# Patient Record
Sex: MALE | Race: OTHER
[De-identification: names, ages, dates, MRNs, and addresses within clinical notes are randomized per-mention and may not be internally consistent; named-entity substitution may affect disease eponyms.]

---

## 2018-11-01 ENCOUNTER — HOSPITAL ENCOUNTER (INPATIENT)
Dept: HOSPITAL 15 - ER | Age: 68
LOS: 6 days | Discharge: HOME | DRG: 282 | End: 2018-11-07
Attending: INTERNAL MEDICINE | Admitting: NURSE PRACTITIONER
Payer: MEDICARE

## 2018-11-01 VITALS — BODY MASS INDEX: 46.65 KG/M2 | HEIGHT: 69 IN | WEIGHT: 315 LBS

## 2018-11-01 DIAGNOSIS — J98.4: ICD-10-CM

## 2018-11-01 DIAGNOSIS — J98.11: ICD-10-CM

## 2018-11-01 DIAGNOSIS — N13.30: ICD-10-CM

## 2018-11-01 DIAGNOSIS — N40.1: ICD-10-CM

## 2018-11-01 DIAGNOSIS — K85.10: Primary | ICD-10-CM

## 2018-11-01 DIAGNOSIS — R33.9: ICD-10-CM

## 2018-11-01 DIAGNOSIS — R79.1: ICD-10-CM

## 2018-11-01 DIAGNOSIS — E46: ICD-10-CM

## 2018-11-01 DIAGNOSIS — Z71.3: ICD-10-CM

## 2018-11-01 DIAGNOSIS — K82.8: ICD-10-CM

## 2018-11-01 DIAGNOSIS — N28.1: ICD-10-CM

## 2018-11-01 LAB
ALBUMIN SERPL-MCNC: 3.8 G/DL (ref 3.4–5)
ALP SERPL-CCNC: 137 U/L (ref 45–117)
ALT SERPL-CCNC: 131 U/L (ref 16–61)
AMYLASE SERPL-CCNC: 102 U/L (ref 25–115)
ANION GAP SERPL CALCULATED.3IONS-SCNC: 10 MMOL/L (ref 5–15)
APTT PPP: 23.5 SEC (ref 23.78–33.04)
BILIRUB SERPL-MCNC: 2 MG/DL (ref 0.2–1)
BUN SERPL-MCNC: 15 MG/DL (ref 7–18)
BUN/CREAT SERPL: 13.9
CALCIUM SERPL-MCNC: 9.2 MG/DL (ref 8.5–10.1)
CHLORIDE SERPL-SCNC: 103 MMOL/L (ref 98–107)
CO2 SERPL-SCNC: 25 MMOL/L (ref 21–32)
GLUCOSE SERPL-MCNC: 187 MG/DL (ref 74–106)
HCT VFR BLD AUTO: 50.8 % (ref 41–53)
HGB BLD-MCNC: 16.9 G/DL (ref 13.5–17.5)
INR PPP: 0.99 (ref 0.9–1.15)
LIPASE SERPL-CCNC: 1180 U/L (ref 73–393)
MAGNESIUM SERPL-MCNC: 1.9 MG/DL (ref 1.6–2.6)
MCH RBC QN AUTO: 32 PG (ref 28–32)
MCV RBC AUTO: 96.1 FL (ref 80–100)
POTASSIUM SERPL-SCNC: 4 MMOL/L (ref 3.5–5.1)
PROT SERPL-MCNC: 8.3 G/DL (ref 6.4–8.2)
PROTHROMBIN TIME: 10.6 SEC (ref 9.27–12.13)
SODIUM SERPL-SCNC: 138 MMOL/L (ref 136–145)

## 2018-11-01 PROCEDURE — 74177 CT ABD & PELVIS W/CONTRAST: CPT

## 2018-11-01 PROCEDURE — 85007 BL SMEAR W/DIFF WBC COUNT: CPT

## 2018-11-01 PROCEDURE — 71046 X-RAY EXAM CHEST 2 VIEWS: CPT

## 2018-11-01 PROCEDURE — 76700 US EXAM ABDOM COMPLETE: CPT

## 2018-11-01 PROCEDURE — 36415 COLL VENOUS BLD VENIPUNCTURE: CPT

## 2018-11-01 PROCEDURE — 83690 ASSAY OF LIPASE: CPT

## 2018-11-01 PROCEDURE — 84443 ASSAY THYROID STIM HORMONE: CPT

## 2018-11-01 PROCEDURE — 84484 ASSAY OF TROPONIN QUANT: CPT

## 2018-11-01 PROCEDURE — 78226 HEPATOBILIARY SYSTEM IMAGING: CPT

## 2018-11-01 PROCEDURE — 81001 URINALYSIS AUTO W/SCOPE: CPT

## 2018-11-01 PROCEDURE — 93005 ELECTROCARDIOGRAM TRACING: CPT

## 2018-11-01 PROCEDURE — 83880 ASSAY OF NATRIURETIC PEPTIDE: CPT

## 2018-11-01 PROCEDURE — 97163 PT EVAL HIGH COMPLEX 45 MIN: CPT

## 2018-11-01 PROCEDURE — 96361 HYDRATE IV INFUSION ADD-ON: CPT

## 2018-11-01 PROCEDURE — 85379 FIBRIN DEGRADATION QUANT: CPT

## 2018-11-01 PROCEDURE — 96375 TX/PRO/DX INJ NEW DRUG ADDON: CPT

## 2018-11-01 PROCEDURE — 96374 THER/PROPH/DIAG INJ IV PUSH: CPT

## 2018-11-01 PROCEDURE — 80074 ACUTE HEPATITIS PANEL: CPT

## 2018-11-01 PROCEDURE — 80053 COMPREHEN METABOLIC PANEL: CPT

## 2018-11-01 PROCEDURE — 85027 COMPLETE CBC AUTOMATED: CPT

## 2018-11-01 PROCEDURE — 85730 THROMBOPLASTIN TIME PARTIAL: CPT

## 2018-11-01 PROCEDURE — 83036 HEMOGLOBIN GLYCOSYLATED A1C: CPT

## 2018-11-01 PROCEDURE — 97116 GAIT TRAINING THERAPY: CPT

## 2018-11-01 PROCEDURE — 85025 COMPLETE CBC W/AUTO DIFF WBC: CPT

## 2018-11-01 PROCEDURE — 82150 ASSAY OF AMYLASE: CPT

## 2018-11-01 PROCEDURE — 71275 CT ANGIOGRAPHY CHEST: CPT

## 2018-11-01 PROCEDURE — 83735 ASSAY OF MAGNESIUM: CPT

## 2018-11-01 PROCEDURE — 94761 N-INVAS EAR/PLS OXIMETRY MLT: CPT

## 2018-11-01 PROCEDURE — 85610 PROTHROMBIN TIME: CPT

## 2018-11-02 VITALS — SYSTOLIC BLOOD PRESSURE: 104 MMHG | DIASTOLIC BLOOD PRESSURE: 61 MMHG

## 2018-11-02 VITALS — DIASTOLIC BLOOD PRESSURE: 61 MMHG | SYSTOLIC BLOOD PRESSURE: 102 MMHG

## 2018-11-02 VITALS — SYSTOLIC BLOOD PRESSURE: 102 MMHG | DIASTOLIC BLOOD PRESSURE: 61 MMHG

## 2018-11-02 VITALS — SYSTOLIC BLOOD PRESSURE: 116 MMHG | DIASTOLIC BLOOD PRESSURE: 67 MMHG

## 2018-11-02 VITALS — DIASTOLIC BLOOD PRESSURE: 69 MMHG | SYSTOLIC BLOOD PRESSURE: 108 MMHG

## 2018-11-02 RX ADMIN — SODIUM CHLORIDE SCH MLS/HR: 0.9 INJECTION, SOLUTION INTRAVENOUS at 01:30

## 2018-11-02 RX ADMIN — CEFTRIAXONE SODIUM SCH MLS/HR: 1 INJECTION, POWDER, FOR SOLUTION INTRAMUSCULAR; INTRAVENOUS at 10:32

## 2018-11-02 RX ADMIN — SODIUM CHLORIDE SCH MLS/HR: 0.9 INJECTION, SOLUTION INTRAVENOUS at 12:00

## 2018-11-03 VITALS — DIASTOLIC BLOOD PRESSURE: 83 MMHG | SYSTOLIC BLOOD PRESSURE: 126 MMHG

## 2018-11-03 VITALS — DIASTOLIC BLOOD PRESSURE: 77 MMHG | SYSTOLIC BLOOD PRESSURE: 126 MMHG

## 2018-11-03 VITALS — DIASTOLIC BLOOD PRESSURE: 79 MMHG | SYSTOLIC BLOOD PRESSURE: 128 MMHG

## 2018-11-03 VITALS — DIASTOLIC BLOOD PRESSURE: 71 MMHG | SYSTOLIC BLOOD PRESSURE: 127 MMHG

## 2018-11-03 VITALS — DIASTOLIC BLOOD PRESSURE: 76 MMHG | SYSTOLIC BLOOD PRESSURE: 130 MMHG

## 2018-11-03 LAB
ALBUMIN SERPL-MCNC: 3 G/DL (ref 3.4–5)
ALP SERPL-CCNC: 124 U/L (ref 45–117)
ALT SERPL-CCNC: 201 U/L (ref 16–61)
AMYLASE SERPL-CCNC: 26 U/L (ref 25–115)
ANION GAP SERPL CALCULATED.3IONS-SCNC: 7 MMOL/L (ref 5–15)
BILIRUB SERPL-MCNC: 2.6 MG/DL (ref 0.2–1)
BUN SERPL-MCNC: 8 MG/DL (ref 7–18)
BUN/CREAT SERPL: 10.1
CALCIUM SERPL-MCNC: 8.1 MG/DL (ref 8.5–10.1)
CHLORIDE SERPL-SCNC: 109 MMOL/L (ref 98–107)
CO2 SERPL-SCNC: 23 MMOL/L (ref 21–32)
GLUCOSE SERPL-MCNC: 145 MG/DL (ref 74–106)
HCT VFR BLD AUTO: 44.8 % (ref 41–53)
HGB BLD-MCNC: 15.1 G/DL (ref 13.5–17.5)
LIPASE SERPL-CCNC: 149 U/L (ref 73–393)
MCH RBC QN AUTO: 32.4 PG (ref 28–32)
MCV RBC AUTO: 96.1 FL (ref 80–100)
NRBC BLD QL AUTO: 0 %
POTASSIUM SERPL-SCNC: 3.9 MMOL/L (ref 3.5–5.1)
PROT SERPL-MCNC: 7 G/DL (ref 6.4–8.2)
SODIUM SERPL-SCNC: 139 MMOL/L (ref 136–145)

## 2018-11-03 RX ADMIN — DEXTROSE SCH UNITS: 5 SOLUTION INTRAVENOUS at 14:34

## 2018-11-03 RX ADMIN — MORPHINE SULFATE PRN MG: 4 INJECTION, SOLUTION INTRAMUSCULAR; INTRAVENOUS at 05:58

## 2018-11-03 RX ADMIN — SODIUM CHLORIDE SCH MLS/HR: 0.9 INJECTION, SOLUTION INTRAVENOUS at 08:00

## 2018-11-03 RX ADMIN — CEFTRIAXONE SODIUM SCH MLS/HR: 1 INJECTION, POWDER, FOR SOLUTION INTRAMUSCULAR; INTRAVENOUS at 09:00

## 2018-11-03 RX ADMIN — SODIUM CHLORIDE SCH MLS/HR: 0.9 INJECTION, SOLUTION INTRAVENOUS at 18:21

## 2018-11-03 RX ADMIN — HYDROCODONE BITARTRATE AND ACETAMINOPHEN PRN TAB: 5; 325 TABLET ORAL at 18:36

## 2018-11-03 RX ADMIN — PANTOPRAZOLE SODIUM SCH MG: 40 INJECTION, POWDER, FOR SOLUTION INTRAVENOUS at 10:00

## 2018-11-03 RX ADMIN — DEXTROSE SCH UNITS: 5 SOLUTION INTRAVENOUS at 21:41

## 2018-11-03 RX ADMIN — MORPHINE SULFATE PRN MG: 4 INJECTION, SOLUTION INTRAMUSCULAR; INTRAVENOUS at 21:07

## 2018-11-03 RX ADMIN — TEMAZEPAM PRN MG: 15 CAPSULE ORAL at 21:40

## 2018-11-04 VITALS — DIASTOLIC BLOOD PRESSURE: 85 MMHG | SYSTOLIC BLOOD PRESSURE: 142 MMHG

## 2018-11-04 VITALS — DIASTOLIC BLOOD PRESSURE: 90 MMHG | SYSTOLIC BLOOD PRESSURE: 145 MMHG

## 2018-11-04 VITALS — DIASTOLIC BLOOD PRESSURE: 76 MMHG | SYSTOLIC BLOOD PRESSURE: 148 MMHG

## 2018-11-04 VITALS — DIASTOLIC BLOOD PRESSURE: 79 MMHG | SYSTOLIC BLOOD PRESSURE: 140 MMHG

## 2018-11-04 VITALS — SYSTOLIC BLOOD PRESSURE: 123 MMHG | DIASTOLIC BLOOD PRESSURE: 79 MMHG

## 2018-11-04 LAB
ALBUMIN SERPL-MCNC: 3.3 G/DL (ref 3.4–5)
ALP SERPL-CCNC: 129 U/L (ref 45–117)
ALT SERPL-CCNC: 137 U/L (ref 16–61)
ANION GAP SERPL CALCULATED.3IONS-SCNC: 8 MMOL/L (ref 5–15)
BILIRUB SERPL-MCNC: 0.9 MG/DL (ref 0.2–1)
BUN SERPL-MCNC: 6 MG/DL (ref 7–18)
BUN/CREAT SERPL: 7.1
CALCIUM SERPL-MCNC: 8.6 MG/DL (ref 8.5–10.1)
CHLORIDE SERPL-SCNC: 105 MMOL/L (ref 98–107)
CO2 SERPL-SCNC: 23 MMOL/L (ref 21–32)
GLUCOSE SERPL-MCNC: 180 MG/DL (ref 74–106)
POTASSIUM SERPL-SCNC: 3.5 MMOL/L (ref 3.5–5.1)
PROT SERPL-MCNC: 8 G/DL (ref 6.4–8.2)
SODIUM SERPL-SCNC: 136 MMOL/L (ref 136–145)

## 2018-11-04 RX ADMIN — MORPHINE SULFATE PRN MG: 4 INJECTION, SOLUTION INTRAMUSCULAR; INTRAVENOUS at 05:36

## 2018-11-04 RX ADMIN — SODIUM CHLORIDE SCH MLS/HR: 0.9 INJECTION, SOLUTION INTRAVENOUS at 14:00

## 2018-11-04 RX ADMIN — DEXTROSE SCH UNITS: 5 SOLUTION INTRAVENOUS at 21:53

## 2018-11-04 RX ADMIN — PANTOPRAZOLE SODIUM SCH MG: 40 INJECTION, POWDER, FOR SOLUTION INTRAVENOUS at 10:01

## 2018-11-04 RX ADMIN — HYDROCODONE BITARTRATE AND ACETAMINOPHEN PRN TAB: 5; 325 TABLET ORAL at 09:19

## 2018-11-04 RX ADMIN — CEFTRIAXONE SODIUM SCH MLS/HR: 1 INJECTION, POWDER, FOR SOLUTION INTRAMUSCULAR; INTRAVENOUS at 09:18

## 2018-11-04 RX ADMIN — TEMAZEPAM PRN MG: 15 CAPSULE ORAL at 21:52

## 2018-11-04 RX ADMIN — SODIUM CHLORIDE SCH MLS/HR: 0.9 INJECTION, SOLUTION INTRAVENOUS at 21:22

## 2018-11-04 RX ADMIN — MORPHINE SULFATE PRN MG: 4 INJECTION, SOLUTION INTRAMUSCULAR; INTRAVENOUS at 12:23

## 2018-11-04 RX ADMIN — DEXTROSE SCH UNITS: 5 SOLUTION INTRAVENOUS at 05:38

## 2018-11-04 RX ADMIN — DEXTROSE SCH UNITS: 5 SOLUTION INTRAVENOUS at 14:36

## 2018-11-04 RX ADMIN — MORPHINE SULFATE PRN MG: 4 INJECTION, SOLUTION INTRAMUSCULAR; INTRAVENOUS at 01:55

## 2018-11-04 RX ADMIN — HYDROCODONE BITARTRATE AND ACETAMINOPHEN PRN TAB: 5; 325 TABLET ORAL at 02:42

## 2018-11-04 RX ADMIN — SODIUM CHLORIDE SCH MLS/HR: 0.9 INJECTION, SOLUTION INTRAVENOUS at 04:44

## 2018-11-05 VITALS — SYSTOLIC BLOOD PRESSURE: 135 MMHG | DIASTOLIC BLOOD PRESSURE: 82 MMHG

## 2018-11-05 VITALS — SYSTOLIC BLOOD PRESSURE: 131 MMHG | DIASTOLIC BLOOD PRESSURE: 76 MMHG

## 2018-11-05 VITALS — DIASTOLIC BLOOD PRESSURE: 69 MMHG | SYSTOLIC BLOOD PRESSURE: 114 MMHG

## 2018-11-05 VITALS — SYSTOLIC BLOOD PRESSURE: 133 MMHG | DIASTOLIC BLOOD PRESSURE: 88 MMHG

## 2018-11-05 VITALS — SYSTOLIC BLOOD PRESSURE: 110 MMHG | DIASTOLIC BLOOD PRESSURE: 68 MMHG

## 2018-11-05 VITALS — SYSTOLIC BLOOD PRESSURE: 140 MMHG | DIASTOLIC BLOOD PRESSURE: 91 MMHG

## 2018-11-05 LAB
ALBUMIN SERPL-MCNC: 2.7 G/DL (ref 3.4–5)
ALP SERPL-CCNC: 95 U/L (ref 45–117)
ALT SERPL-CCNC: 89 U/L (ref 16–61)
ANION GAP SERPL CALCULATED.3IONS-SCNC: 7 MMOL/L (ref 5–15)
BILIRUB SERPL-MCNC: 0.8 MG/DL (ref 0.2–1)
BUN SERPL-MCNC: 8 MG/DL (ref 7–18)
BUN/CREAT SERPL: 9.5
CALCIUM SERPL-MCNC: 8.1 MG/DL (ref 8.5–10.1)
CHLORIDE SERPL-SCNC: 112 MMOL/L (ref 98–107)
CO2 SERPL-SCNC: 24 MMOL/L (ref 21–32)
GLUCOSE SERPL-MCNC: 117 MG/DL (ref 74–106)
POTASSIUM SERPL-SCNC: 3.6 MMOL/L (ref 3.5–5.1)
PROT SERPL-MCNC: 6.7 G/DL (ref 6.4–8.2)
SODIUM SERPL-SCNC: 143 MMOL/L (ref 136–145)

## 2018-11-05 RX ADMIN — TEMAZEPAM PRN MG: 15 CAPSULE ORAL at 20:56

## 2018-11-05 RX ADMIN — DEXTROSE SCH UNITS: 5 SOLUTION INTRAVENOUS at 20:55

## 2018-11-05 RX ADMIN — SODIUM CHLORIDE SCH MLS/HR: 0.9 INJECTION, SOLUTION INTRAVENOUS at 09:53

## 2018-11-05 RX ADMIN — CEFTRIAXONE SODIUM SCH MLS/HR: 1 INJECTION, POWDER, FOR SOLUTION INTRAMUSCULAR; INTRAVENOUS at 09:51

## 2018-11-05 RX ADMIN — DEXTROSE SCH UNITS: 5 SOLUTION INTRAVENOUS at 14:19

## 2018-11-05 RX ADMIN — PANTOPRAZOLE SODIUM SCH MG: 40 INJECTION, POWDER, FOR SOLUTION INTRAVENOUS at 09:51

## 2018-11-05 RX ADMIN — DEXTROSE SCH UNITS: 5 SOLUTION INTRAVENOUS at 06:02

## 2018-11-05 RX ADMIN — SODIUM CHLORIDE SCH MLS/HR: 0.9 INJECTION, SOLUTION INTRAVENOUS at 20:00

## 2018-11-06 VITALS — DIASTOLIC BLOOD PRESSURE: 75 MMHG | SYSTOLIC BLOOD PRESSURE: 129 MMHG

## 2018-11-06 VITALS — SYSTOLIC BLOOD PRESSURE: 124 MMHG | DIASTOLIC BLOOD PRESSURE: 75 MMHG

## 2018-11-06 VITALS — SYSTOLIC BLOOD PRESSURE: 127 MMHG | DIASTOLIC BLOOD PRESSURE: 81 MMHG

## 2018-11-06 VITALS — SYSTOLIC BLOOD PRESSURE: 121 MMHG | DIASTOLIC BLOOD PRESSURE: 72 MMHG

## 2018-11-06 VITALS — SYSTOLIC BLOOD PRESSURE: 127 MMHG | DIASTOLIC BLOOD PRESSURE: 77 MMHG

## 2018-11-06 LAB
ALBUMIN SERPL-MCNC: 2.9 G/DL (ref 3.4–5)
ALP SERPL-CCNC: 90 U/L (ref 45–117)
ALT SERPL-CCNC: 74 U/L (ref 16–61)
ANION GAP SERPL CALCULATED.3IONS-SCNC: 8 MMOL/L (ref 5–15)
BILIRUB SERPL-MCNC: 0.9 MG/DL (ref 0.2–1)
BUN SERPL-MCNC: 7 MG/DL (ref 7–18)
BUN/CREAT SERPL: 9.2
CALCIUM SERPL-MCNC: 8.7 MG/DL (ref 8.5–10.1)
CHLORIDE SERPL-SCNC: 108 MMOL/L (ref 98–107)
CO2 SERPL-SCNC: 27 MMOL/L (ref 21–32)
GLUCOSE SERPL-MCNC: 100 MG/DL (ref 74–106)
HCT VFR BLD AUTO: 43.5 % (ref 41–53)
HGB BLD-MCNC: 14.6 G/DL (ref 13.5–17.5)
MCH RBC QN AUTO: 31.9 PG (ref 28–32)
MCV RBC AUTO: 94.9 FL (ref 80–100)
NRBC BLD QL AUTO: 0.1 %
POTASSIUM SERPL-SCNC: 4 MMOL/L (ref 3.5–5.1)
PROT SERPL-MCNC: 6.9 G/DL (ref 6.4–8.2)
SODIUM SERPL-SCNC: 143 MMOL/L (ref 136–145)

## 2018-11-06 RX ADMIN — DEXTROSE SCH UNITS: 5 SOLUTION INTRAVENOUS at 14:24

## 2018-11-06 RX ADMIN — PANTOPRAZOLE SODIUM SCH MG: 40 INJECTION, POWDER, FOR SOLUTION INTRAVENOUS at 10:32

## 2018-11-06 RX ADMIN — SODIUM CHLORIDE SCH MLS/HR: 0.9 INJECTION, SOLUTION INTRAVENOUS at 06:00

## 2018-11-06 RX ADMIN — DEXTROSE SCH UNITS: 5 SOLUTION INTRAVENOUS at 21:40

## 2018-11-06 RX ADMIN — CEFTRIAXONE SODIUM SCH MLS/HR: 1 INJECTION, POWDER, FOR SOLUTION INTRAMUSCULAR; INTRAVENOUS at 10:32

## 2018-11-06 RX ADMIN — DEXTROSE SCH UNITS: 5 SOLUTION INTRAVENOUS at 06:08

## 2018-11-07 VITALS — SYSTOLIC BLOOD PRESSURE: 138 MMHG | DIASTOLIC BLOOD PRESSURE: 84 MMHG

## 2018-11-07 VITALS — SYSTOLIC BLOOD PRESSURE: 136 MMHG | DIASTOLIC BLOOD PRESSURE: 84 MMHG

## 2018-11-07 VITALS — DIASTOLIC BLOOD PRESSURE: 80 MMHG | SYSTOLIC BLOOD PRESSURE: 137 MMHG

## 2018-11-07 VITALS — SYSTOLIC BLOOD PRESSURE: 132 MMHG | DIASTOLIC BLOOD PRESSURE: 79 MMHG

## 2018-11-07 RX ADMIN — DEXTROSE SCH UNITS: 5 SOLUTION INTRAVENOUS at 05:30

## 2018-11-07 RX ADMIN — CEFTRIAXONE SODIUM SCH MLS/HR: 1 INJECTION, POWDER, FOR SOLUTION INTRAMUSCULAR; INTRAVENOUS at 09:43

## 2018-11-07 RX ADMIN — PANTOPRAZOLE SODIUM SCH MG: 40 INJECTION, POWDER, FOR SOLUTION INTRAVENOUS at 09:43

## 2018-11-07 RX ADMIN — DEXTROSE SCH UNITS: 5 SOLUTION INTRAVENOUS at 14:00

## 2018-12-14 ENCOUNTER — HOSPITAL ENCOUNTER (EMERGENCY)
Dept: HOSPITAL 15 - ER | Age: 68
Discharge: HOME | End: 2018-12-14
Payer: MEDICARE

## 2018-12-14 VITALS — WEIGHT: 215 LBS | HEIGHT: 68 IN | BODY MASS INDEX: 32.58 KG/M2

## 2018-12-14 VITALS — SYSTOLIC BLOOD PRESSURE: 129 MMHG | DIASTOLIC BLOOD PRESSURE: 79 MMHG

## 2018-12-14 DIAGNOSIS — E11.9: ICD-10-CM

## 2018-12-14 DIAGNOSIS — R33.9: Primary | ICD-10-CM

## 2018-12-14 PROCEDURE — 51702 INSERT TEMP BLADDER CATH: CPT

## 2018-12-14 PROCEDURE — 81001 URINALYSIS AUTO W/SCOPE: CPT

## 2021-02-01 ENCOUNTER — HOSPITAL ENCOUNTER (EMERGENCY)
Dept: HOSPITAL 15 - ER | Age: 71
Discharge: HOME | End: 2021-02-01
Payer: MEDICARE

## 2021-02-01 VITALS — DIASTOLIC BLOOD PRESSURE: 96 MMHG | SYSTOLIC BLOOD PRESSURE: 149 MMHG

## 2021-02-01 VITALS — HEIGHT: 68 IN | BODY MASS INDEX: 30.31 KG/M2 | WEIGHT: 200 LBS

## 2021-02-01 DIAGNOSIS — N39.0: Primary | ICD-10-CM

## 2021-02-01 DIAGNOSIS — Z46.6: ICD-10-CM

## 2021-02-01 DIAGNOSIS — E11.9: ICD-10-CM

## 2021-02-01 PROCEDURE — 99284 EMERGENCY DEPT VISIT MOD MDM: CPT

## 2021-02-01 PROCEDURE — 81002 URINALYSIS NONAUTO W/O SCOPE: CPT

## 2021-02-01 PROCEDURE — 51702 INSERT TEMP BLADDER CATH: CPT

## 2021-02-01 PROCEDURE — 96372 THER/PROPH/DIAG INJ SC/IM: CPT

## 2023-01-21 ENCOUNTER — HOSPITAL ENCOUNTER (EMERGENCY)
Dept: HOSPITAL 15 - ER | Age: 73
Discharge: HOME | End: 2023-01-21
Payer: MEDICARE

## 2023-01-21 VITALS — SYSTOLIC BLOOD PRESSURE: 137 MMHG | DIASTOLIC BLOOD PRESSURE: 78 MMHG

## 2023-01-21 VITALS — HEIGHT: 68 IN | BODY MASS INDEX: 25.83 KG/M2 | WEIGHT: 170.42 LBS

## 2023-01-21 DIAGNOSIS — N40.1: Primary | ICD-10-CM

## 2023-01-21 DIAGNOSIS — I10: ICD-10-CM

## 2023-01-21 DIAGNOSIS — E11.65: ICD-10-CM

## 2023-01-21 LAB
ALBUMIN SERPL-MCNC: 3.5 G/DL (ref 3.4–5)
ALP SERPL-CCNC: 60 U/L (ref 45–117)
ALT SERPL-CCNC: 35 U/L (ref 16–61)
ANION GAP SERPL CALCULATED.3IONS-SCNC: 5 MMOL/L (ref 5–15)
BILIRUB SERPL-MCNC: 0.5 MG/DL (ref 0.2–1)
BUN SERPL-MCNC: 15 MG/DL (ref 7–18)
BUN/CREAT SERPL: 17.4
CALCIUM SERPL-MCNC: 8.4 MG/DL (ref 8.5–10.1)
CHLORIDE SERPL-SCNC: 105 MMOL/L (ref 98–107)
CO2 SERPL-SCNC: 26 MMOL/L (ref 21–32)
GLUCOSE SERPL-MCNC: 196 MG/DL (ref 74–106)
HCT VFR BLD AUTO: 45.5 % (ref 41–53)
HGB BLD-MCNC: 15.2 G/DL (ref 13.5–17.5)
LIPASE SERPL-CCNC: 137 U/L (ref 73–393)
MAGNESIUM SERPL-MCNC: 2.6 MG/DL (ref 1.6–2.6)
MCH RBC QN AUTO: 31.8 PG (ref 28–32)
MCV RBC AUTO: 95 FL (ref 80–100)
NRBC BLD QL AUTO: 0 %
POTASSIUM SERPL-SCNC: 3.9 MMOL/L (ref 3.5–5.1)
PROT SERPL-MCNC: 7.6 G/DL (ref 6.4–8.2)
SODIUM SERPL-SCNC: 136 MMOL/L (ref 136–145)

## 2023-01-21 PROCEDURE — 83735 ASSAY OF MAGNESIUM: CPT

## 2023-01-21 PROCEDURE — 80053 COMPREHEN METABOLIC PANEL: CPT

## 2023-01-21 PROCEDURE — 83690 ASSAY OF LIPASE: CPT

## 2023-01-21 PROCEDURE — 81001 URINALYSIS AUTO W/SCOPE: CPT

## 2023-01-21 PROCEDURE — 99284 EMERGENCY DEPT VISIT MOD MDM: CPT

## 2023-01-21 PROCEDURE — 96361 HYDRATE IV INFUSION ADD-ON: CPT

## 2023-01-21 PROCEDURE — 51702 INSERT TEMP BLADDER CATH: CPT

## 2023-01-21 PROCEDURE — 36415 COLL VENOUS BLD VENIPUNCTURE: CPT

## 2023-01-21 PROCEDURE — 85025 COMPLETE CBC W/AUTO DIFF WBC: CPT

## 2023-01-21 PROCEDURE — 96365 THER/PROPH/DIAG IV INF INIT: CPT

## 2024-09-03 ENCOUNTER — HOSPITAL ENCOUNTER (EMERGENCY)
Dept: HOSPITAL 15 - ER | Age: 74
Discharge: LEFT BEFORE DISCHARGE | End: 2024-09-03
Payer: MEDICARE

## 2024-09-03 VITALS
SYSTOLIC BLOOD PRESSURE: 187 MMHG | RESPIRATION RATE: 24 BRPM | DIASTOLIC BLOOD PRESSURE: 92 MMHG | OXYGEN SATURATION: 97 %

## 2024-09-03 VITALS — BODY MASS INDEX: 30.84 KG/M2 | WEIGHT: 240.3 LBS | HEIGHT: 74 IN

## 2024-09-03 VITALS — HEART RATE: 73 BPM

## 2024-09-03 DIAGNOSIS — Z98.890: ICD-10-CM

## 2024-09-03 DIAGNOSIS — R33.9: Primary | ICD-10-CM

## 2024-09-03 DIAGNOSIS — Z79.899: ICD-10-CM

## 2024-09-03 DIAGNOSIS — E11.9: ICD-10-CM

## 2024-09-03 PROCEDURE — 93005 ELECTROCARDIOGRAM TRACING: CPT

## 2024-09-03 PROCEDURE — 51702 INSERT TEMP BLADDER CATH: CPT

## 2024-09-20 ENCOUNTER — HOSPITAL ENCOUNTER (INPATIENT)
Dept: HOSPITAL 15 - ER | Age: 74
LOS: 2 days | Discharge: HOME | DRG: 466 | End: 2024-09-22
Attending: EMERGENCY MEDICINE | Admitting: INTERNAL MEDICINE
Payer: MEDICARE

## 2024-09-20 VITALS — RESPIRATION RATE: 16 BRPM | OXYGEN SATURATION: 100 % | HEART RATE: 72 BPM

## 2024-09-20 VITALS — HEART RATE: 84 BPM | OXYGEN SATURATION: 95 % | RESPIRATION RATE: 16 BRPM

## 2024-09-20 VITALS
DIASTOLIC BLOOD PRESSURE: 79 MMHG | TEMPERATURE: 98.3 F | HEART RATE: 70 BPM | RESPIRATION RATE: 18 BRPM | SYSTOLIC BLOOD PRESSURE: 139 MMHG | OXYGEN SATURATION: 94 %

## 2024-09-20 VITALS
OXYGEN SATURATION: 94 % | SYSTOLIC BLOOD PRESSURE: 139 MMHG | HEART RATE: 70 BPM | TEMPERATURE: 98.3 F | RESPIRATION RATE: 18 BRPM | DIASTOLIC BLOOD PRESSURE: 79 MMHG

## 2024-09-20 VITALS — HEIGHT: 67 IN | BODY MASS INDEX: 33.11 KG/M2 | WEIGHT: 210.98 LBS

## 2024-09-20 DIAGNOSIS — N40.1: ICD-10-CM

## 2024-09-20 DIAGNOSIS — Z79.2: ICD-10-CM

## 2024-09-20 DIAGNOSIS — R33.8: ICD-10-CM

## 2024-09-20 DIAGNOSIS — Z90.49: ICD-10-CM

## 2024-09-20 DIAGNOSIS — Z79.899: ICD-10-CM

## 2024-09-20 DIAGNOSIS — Z79.84: ICD-10-CM

## 2024-09-20 DIAGNOSIS — N39.0: ICD-10-CM

## 2024-09-20 DIAGNOSIS — T83.518A: Primary | ICD-10-CM

## 2024-09-20 DIAGNOSIS — E11.65: ICD-10-CM

## 2024-09-20 LAB
ANION GAP SERPL CALCULATED.3IONS-SCNC: 6 MMOL/L (ref 5–15)
BUN SERPL-MCNC: 18 MG/DL (ref 9–23)
BUN/CREAT SERPL: 18 (ref 10–20)
CALCIUM SERPL-MCNC: 9.9 MG/DL (ref 8.7–10.4)
CHLORIDE SERPL-SCNC: 106 MMOL/L (ref 98–107)
CHOLEST SERPL-MCNC: 190 MG/DL (ref ?–200)
CO2 SERPL-SCNC: 28 MMOL/L (ref 20–30)
GLUCOSE SERPL-MCNC: 215 MG/DL (ref 74–106)
HCT VFR BLD AUTO: 46.9 % (ref 41–53)
HDLC SERPL-MCNC: 38 MG/DL (ref 40–59)
HGB BLD-MCNC: 16.4 G/DL (ref 13.5–17.5)
MCH RBC QN AUTO: 33.1 PG (ref 28–32)
MCV RBC AUTO: 94.8 FL (ref 80–100)
NRBC BLD QL AUTO: 0.1 %
POTASSIUM SERPL-SCNC: 4.1 MMOL/L (ref 3.5–5.1)
PROT UR STRIP.AUTO-MCNC: (no result) MG/DL
SODIUM SERPL-SCNC: 140 MMOL/L (ref 136–145)
TRIGL SERPL-MCNC: 124 MG/DL (ref ?–150)

## 2024-09-20 PROCEDURE — 80307 DRUG TEST PRSMV CHEM ANLYZR: CPT

## 2024-09-20 PROCEDURE — 84154 ASSAY OF PSA FREE: CPT

## 2024-09-20 PROCEDURE — 81001 URINALYSIS AUTO W/SCOPE: CPT

## 2024-09-20 PROCEDURE — 87086 URINE CULTURE/COLONY COUNT: CPT

## 2024-09-20 PROCEDURE — 76775 US EXAM ABDO BACK WALL LIM: CPT

## 2024-09-20 PROCEDURE — 85025 COMPLETE CBC W/AUTO DIFF WBC: CPT

## 2024-09-20 PROCEDURE — 80061 LIPID PANEL: CPT

## 2024-09-20 PROCEDURE — 80048 BASIC METABOLIC PNL TOTAL CA: CPT

## 2024-09-20 PROCEDURE — 83036 HEMOGLOBIN GLYCOSYLATED A1C: CPT

## 2024-09-20 PROCEDURE — 71045 X-RAY EXAM CHEST 1 VIEW: CPT

## 2024-09-20 PROCEDURE — 84153 ASSAY OF PSA TOTAL: CPT

## 2024-09-20 PROCEDURE — 96365 THER/PROPH/DIAG IV INF INIT: CPT

## 2024-09-20 PROCEDURE — 84484 ASSAY OF TROPONIN QUANT: CPT

## 2024-09-20 PROCEDURE — 36415 COLL VENOUS BLD VENIPUNCTURE: CPT

## 2024-09-20 RX ADMIN — SODIUM CHLORIDE SCH MLS/HR: 0.9 INJECTION, SOLUTION INTRAVENOUS at 15:00

## 2024-09-20 RX ADMIN — PIPERACILLIN SODIUM AND TAZOBACTAM SODIUM ONE MLS/HR: .375; 3 INJECTION, POWDER, LYOPHILIZED, FOR SOLUTION INTRAVENOUS at 15:45

## 2024-09-20 RX ADMIN — PIPERACILLIN SODIUM AND TAZOBACTAM SODIUM SCH MLS/HR: .375; 3 INJECTION, POWDER, LYOPHILIZED, FOR SOLUTION INTRAVENOUS at 22:28

## 2024-09-20 RX ADMIN — TAMSULOSIN HYDROCHLORIDE SCH MG: 0.4 CAPSULE ORAL at 22:28

## 2024-09-21 VITALS
HEART RATE: 100 BPM | OXYGEN SATURATION: 93 % | RESPIRATION RATE: 20 BRPM | DIASTOLIC BLOOD PRESSURE: 76 MMHG | SYSTOLIC BLOOD PRESSURE: 135 MMHG | TEMPERATURE: 98.5 F

## 2024-09-21 VITALS
HEART RATE: 70 BPM | SYSTOLIC BLOOD PRESSURE: 122 MMHG | DIASTOLIC BLOOD PRESSURE: 72 MMHG | RESPIRATION RATE: 20 BRPM | OXYGEN SATURATION: 95 % | TEMPERATURE: 98 F

## 2024-09-21 VITALS
OXYGEN SATURATION: 97 % | DIASTOLIC BLOOD PRESSURE: 77 MMHG | SYSTOLIC BLOOD PRESSURE: 120 MMHG | TEMPERATURE: 98 F | HEART RATE: 71 BPM | RESPIRATION RATE: 16 BRPM

## 2024-09-21 VITALS
SYSTOLIC BLOOD PRESSURE: 110 MMHG | DIASTOLIC BLOOD PRESSURE: 64 MMHG | TEMPERATURE: 98 F | OXYGEN SATURATION: 94 % | RESPIRATION RATE: 16 BRPM | HEART RATE: 81 BPM

## 2024-09-21 VITALS
OXYGEN SATURATION: 94 % | SYSTOLIC BLOOD PRESSURE: 126 MMHG | TEMPERATURE: 98.2 F | HEART RATE: 79 BPM | RESPIRATION RATE: 18 BRPM | DIASTOLIC BLOOD PRESSURE: 75 MMHG

## 2024-09-21 VITALS
RESPIRATION RATE: 20 BRPM | HEART RATE: 70 BPM | TEMPERATURE: 97.8 F | OXYGEN SATURATION: 94 % | DIASTOLIC BLOOD PRESSURE: 71 MMHG | SYSTOLIC BLOOD PRESSURE: 118 MMHG

## 2024-09-21 VITALS — OXYGEN SATURATION: 99 % | RESPIRATION RATE: 18 BRPM | HEART RATE: 70 BPM

## 2024-09-21 RX ADMIN — CEFTRIAXONE SODIUM ONE MLS/HR: 1 INJECTION, POWDER, FOR SOLUTION INTRAMUSCULAR; INTRAVENOUS at 13:52

## 2024-09-21 RX ADMIN — ENOXAPARIN SODIUM SCH MG: 40 INJECTION SUBCUTANEOUS at 08:02

## 2024-09-22 VITALS
TEMPERATURE: 98.1 F | SYSTOLIC BLOOD PRESSURE: 135 MMHG | RESPIRATION RATE: 18 BRPM | DIASTOLIC BLOOD PRESSURE: 68 MMHG | HEART RATE: 79 BPM | OXYGEN SATURATION: 92 %

## 2024-09-22 VITALS
RESPIRATION RATE: 18 BRPM | HEART RATE: 79 BPM | TEMPERATURE: 98.1 F | SYSTOLIC BLOOD PRESSURE: 135 MMHG | DIASTOLIC BLOOD PRESSURE: 68 MMHG | OXYGEN SATURATION: 99 %

## 2024-09-22 VITALS
TEMPERATURE: 98.3 F | SYSTOLIC BLOOD PRESSURE: 127 MMHG | RESPIRATION RATE: 18 BRPM | HEART RATE: 75 BPM | OXYGEN SATURATION: 96 % | DIASTOLIC BLOOD PRESSURE: 70 MMHG

## 2024-09-22 VITALS
HEART RATE: 79 BPM | SYSTOLIC BLOOD PRESSURE: 135 MMHG | DIASTOLIC BLOOD PRESSURE: 68 MMHG | TEMPERATURE: 98.1 F | RESPIRATION RATE: 18 BRPM | OXYGEN SATURATION: 99 %

## 2024-09-22 VITALS
SYSTOLIC BLOOD PRESSURE: 141 MMHG | TEMPERATURE: 98 F | OXYGEN SATURATION: 94 % | HEART RATE: 63 BPM | DIASTOLIC BLOOD PRESSURE: 72 MMHG | RESPIRATION RATE: 18 BRPM

## 2024-09-22 VITALS — OXYGEN SATURATION: 99 % | RESPIRATION RATE: 18 BRPM | HEART RATE: 63 BPM

## 2024-09-22 VITALS
SYSTOLIC BLOOD PRESSURE: 141 MMHG | DIASTOLIC BLOOD PRESSURE: 81 MMHG | RESPIRATION RATE: 18 BRPM | OXYGEN SATURATION: 95 % | HEART RATE: 74 BPM | TEMPERATURE: 97.9 F

## 2024-09-22 LAB — PSA SERPL-MCNC: 8 NG/ML (ref 0–4)

## 2024-09-22 RX ADMIN — CEFTRIAXONE SODIUM SCH MLS/HR: 1 INJECTION, POWDER, FOR SOLUTION INTRAMUSCULAR; INTRAVENOUS at 08:15

## 2024-12-01 ENCOUNTER — HOSPITAL ENCOUNTER (EMERGENCY)
Dept: HOSPITAL 15 - ER | Age: 74
Discharge: HOME | End: 2024-12-01
Payer: MEDICARE

## 2024-12-01 VITALS — WEIGHT: 194.01 LBS | HEIGHT: 68 IN | BODY MASS INDEX: 29.4 KG/M2

## 2024-12-01 VITALS — RESPIRATION RATE: 16 BRPM | OXYGEN SATURATION: 95 % | HEART RATE: 111 BPM

## 2024-12-01 VITALS
SYSTOLIC BLOOD PRESSURE: 98 MMHG | HEART RATE: 95 BPM | RESPIRATION RATE: 19 BRPM | DIASTOLIC BLOOD PRESSURE: 69 MMHG | OXYGEN SATURATION: 100 %

## 2024-12-01 VITALS — HEIGHT: 68 IN | BODY MASS INDEX: 29.44 KG/M2 | WEIGHT: 194.23 LBS

## 2024-12-01 VITALS — SYSTOLIC BLOOD PRESSURE: 165 MMHG | DIASTOLIC BLOOD PRESSURE: 87 MMHG

## 2024-12-01 DIAGNOSIS — E11.9: ICD-10-CM

## 2024-12-01 DIAGNOSIS — T83.098A: Primary | ICD-10-CM

## 2024-12-01 DIAGNOSIS — Z90.49: ICD-10-CM

## 2024-12-01 DIAGNOSIS — N39.0: Primary | ICD-10-CM
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
## 2024-12-01 DIAGNOSIS — Z88.6: ICD-10-CM

## 2024-12-01 DIAGNOSIS — Z79.2: ICD-10-CM

## 2024-12-01 DIAGNOSIS — Z79.84: ICD-10-CM

## 2024-12-01 LAB
ANION GAP SERPL CALCULATED.3IONS-SCNC: 13 MMOL/L (ref 5–15)
BUN SERPL-MCNC: 18 MG/DL (ref 9–23)
BUN/CREAT SERPL: 19.6 (ref 10–20)
CALCIUM SERPL-MCNC: 10 MG/DL (ref 8.7–10.4)
CHLORIDE SERPL-SCNC: 105 MMOL/L (ref 98–107)
CO2 SERPL-SCNC: 21 MMOL/L (ref 20–31)
GLUCOSE SERPL-MCNC: 220 MG/DL (ref 74–106)
HCT VFR BLD AUTO: 47.5 % (ref 41–53)
HGB BLD-MCNC: 16.4 G/DL (ref 13.5–17.5)
MCH RBC QN AUTO: 32.5 PG (ref 28–32)
MCV RBC AUTO: 93.9 FL (ref 80–100)
NRBC BLD QL AUTO: 0.1 %
POTASSIUM SERPL-SCNC: 4.4 MMOL/L (ref 3.5–5.1)
PROT UR STRIP.AUTO-MCNC: (no result) MG/DL
SODIUM SERPL-SCNC: 139 MMOL/L (ref 136–145)

## 2024-12-01 PROCEDURE — 36415 COLL VENOUS BLD VENIPUNCTURE: CPT

## 2024-12-01 PROCEDURE — 96372 THER/PROPH/DIAG INJ SC/IM: CPT

## 2024-12-01 PROCEDURE — 99283 EMERGENCY DEPT VISIT LOW MDM: CPT

## 2024-12-01 PROCEDURE — 81001 URINALYSIS AUTO W/SCOPE: CPT

## 2024-12-01 PROCEDURE — 51702 INSERT TEMP BLADDER CATH: CPT

## 2024-12-01 PROCEDURE — 85025 COMPLETE CBC W/AUTO DIFF WBC: CPT

## 2024-12-01 PROCEDURE — 80048 BASIC METABOLIC PNL TOTAL CA: CPT

## 2024-12-01 RX ADMIN — HYDROCODONE BITARTRATE AND ACETAMINOPHEN ONE TAB: 5; 325 TABLET ORAL at 05:04

## 2024-12-01 RX ADMIN — DEXTROSE ONE MG: 50 INJECTION, SOLUTION INTRAVENOUS at 05:05

## 2024-12-01 NOTE — ED.PDOC
General


HPI Comments


74Y M with PMHx DM and appendectomy presents to ED for chief complaint urinary 

retention. Pt had flores catheter placed ~15 days ago for urinary retention. 

However, no urine has drained out since last night. Pt presents to ED with flores

bag in place with bloody residue. Additional symptom includes suprapubic pain. 

Pt denies fever, n/v/d, and SOB. Pt is currently taking Cipro. Allergies include

ASA.


Chief Complaint:  Urinary


Time Seen by MD:  14:09


Primary Care Provider:  DAHIANA Antonio notes:  Medications, Allergies


Allergies:  


Coded Allergies:  


     Aspirin (Verified  Allergy, Severe, 12/1/24)


Information Source:  Patient


Mode of Arrival:  Ambulatory


Severity:  Moderate


Inability to void:  Complete


Timing:  Days


Duration:  Since onset


Has not urinated for:  Hours


Onset:  Spontaneous


Symptoms:  Inability to void


History of:  Other


Location:  Suprapubic


Modifying factors:  None


associated signs and symptoms:  Abdominal Pain (suprapubic), Inability to Void





Past Medical History


PAST MEDICAL HISTORY:  DM


Surgical History:  Appendectomy





Family History


Family History:  Reviewed,noncontributory to illness, Unknown





Social History


Smoker:  Non-Smoker


Alcohol:  Denies ETOH Use


Drugs:  Denies Drug Use


Lives In:  Home





Constitutional:  denies: chills, diaphoresis, fatigue, fever, malaise, sweats, 

weakness, others


EENTM:  denies: blurred vision, double vision, ear bleeding, ear discharge, ear 

drainage, ear pain, ear ringing, eye pain, eye redness, hearing loss, mouth 

pain, mouth swelling, nasal discharge, nose bleeding, nose congestion, nose 

pain, photophobia, tearing, throat pain, throat swelling, voice changes, others


Respiratory:  denies: cough, hemoptysis, orthopnea, SOB at rest, shortness of 

breath, SOB with excertion, stridor, wheezing, others


Cardiovascular:  denies: chest pain, dizzy spells, diaphoresis, Dyspnea on 

exertion, edema, irregular heart beat, left arm pain, lightheadedness, 

palpitations, PND, syncope, others


Gastrointestinal:  denies: abdomen distended, abdominal pain, blood streaked 

bowels, constipated, diarrhea, dysphagia, difficulty swallowing, hematemesis, 

melena, nausea, poor appetite, poor fluid intake, rectal bleeding, rectal pain, 

vomiting, others


Genitourinary:  reports: pain (suprapubic), others (retention); denies: burning,

dysuria, flank pain, frequency, hematuria, incontinence, penile discharge, 

penile sore, testicle pain, testicle swelling, urgency


Neurological:  denies: dizziness, fainting, headache, left sided numbness, left 

sided weakness, numbness, paresthesia, pre-existing deficit, right sided 

numbness, right sided weakness, seizure, speech problems, tingling, tremors, 

weakness, others


Musculoskeletal:  denies: back pain, gout, joint pain, joint swelling, muscle 

pain, muscle stiffness, neck pain, others


Integumetry:  denies: bruises, change in color, change in hair/nails, dryness, 

laceration, lesions, lumps, rash, wounds, others


Allergic/Immunocompromised:  denies: Difficulty Healing, Frequent Infections, Hi

ves, Itching, others


Hematologic/Lymphatic:  denies: anemia, blood clots, easy bleeding, easy 

bruising, swollen glands, others


Endocrine:  denies: excessive hunger, excessive sweating, excessive thirst, 

excessive urination, flushing, intolerance to cold, intolerance to heat, 

unexplained weight gain, unexplained weight loss, others


Psychiatric:  denies: anxiety, bipolar disorder, depression, hopeless, panic 

disorder, schizophrenia, sleepless, suicidal, others


All Other Systems:  Reviewed and Negative





Physical Exam


General Appearance:  No Apparent Distress, Normal


HEENT:  Normal ENT Inspection, Pharynx Normal, TMs Normal


Neck:  Full Range of Motion, Non-Tender, Normal, Normal Inspection


Respiratory:  Chest Non-Tender, Lungs Clear, No Accessory Muscle Use, No 

Respiratory Distress, Normal Breath Sounds


Cardiovascular:  No Edema, No JVD, No Murmur, No Gallop, Normal Peripheral 

Pulses, Regular Rate/Rhythm


Breast Exam:  Deferred


Gastrointestinal:  No Organomegaly, Normal Bowel Sounds, Suprapubic, Tenderness


Genitalia:  Other (urinary catheter not draining), Deferred


Pelvic:  Deferred


Rectal:  Deferred


Extremities:  No calf tenderness, Normal capillary refill, Normal inspection, 

Normal range of motion, Non-tender, No pedal edema


Musculoskeletal :  


   Apperance:  Normal


Neurologic:  Alert, CNs II-XII nml as Tested, No Motor Deficits, Normal Affect, 

Normal Mood, No Sensory Deficits


Cerebellar Function:  NOT DONE


Reflexes:  NOT DONE


Skin:  Dry, Normal Color, Warm


Lymphatic:  No Adenopathy





Was a procedure done?


Was a procedure done?:  No





Differential Diagnosis


Kidney stone (Female):  N/A


Kidney stone (Male):  Other


Penile/Scrotal:  UTI


Urinary Problem (Female):  N/A





X-Ray, Labs, Meds, VS





                                   Vital Signs








  Date Time  Temp Pulse Resp B/P (MAP) Pulse Ox O2 Delivery O2 Flow Rate FiO2


 


12/1/24 13:14 98.9 110 18 138/86 (103) 96   








                                       Lab








Test


 12/1/24


15:05 Range/Units


 


 


White Blood Count


 10.0 


 4.4-10.8


10^3/uL


 


Red Blood Count


 5.06 


 4.5-5.90


10^6/uL


 


Hemoglobin 16.4  13.5-17.5  g/dL


 


Hematocrit 47.5  41.0-53.0  %


 


Mean Corpuscular Volume 93.9  80.0-100.0  fL


 


Mean Corpuscular Hemoglobin 32.5 H 28.0-32.0  pg


 


Mean Corpuscular Hemoglobin


Concent 34.6 


 32.0-36.0  g/dL





 


Red Cell Distribution Width 13.5  11.8-14.3  %


 


Platelet Count


 326 


 140-450


10^3/uL


 


Mean Platelet Volume 7.1  6.9-10.8  fL


 


Neutrophils (%) (Auto) 78.1  37.0-80.0  %


 


Lymphocytes (%) (Auto) 16.5  10.0-50.0  %


 


Monocytes (%) (Auto) 4.9  0.0-12.0  %


 


Eosinophils (%) (Auto) 0.1  0.0-7.0  %


 


Basophils (%) (Auto) 0.4  0.0-2.0  %


 


Neutrophils # (Auto)


 7.8 


 1.6-8.6  10


^3/uL


 


Lymphocytes # (Auto)


 1.7 


 0.4-5.4  10


^3/uL


 


Monocytes # (Auto) 0.5  0-1.3  10 ^3/uL


 


Eosinophils # (Auto) 0  0-0.8  10 ^3/uL


 


Basophils # (Auto) 0  0-0.2  10 ^3/uL


 


Nucleated Red Blood Cells 0.1   %


 


Sodium Level 139  136-145  mmol/L


 


Potassium Level 4.4  3.5-5.1  mmol/L


 


Chloride Level 105    mmol/L


 


Carbon Dioxide Level 21  20-31  mmol/L


 


Anion Gap 13  5-15  


 


Blood Urea Nitrogen 18  9-23  mg/dL


 


Creatinine


 0.92 


 0.700-1.30


mg/dL


 


Glomerular Filtration Rate


Calc 88 


 >90  mL/min





 


BUN/Creatinine Ratio 19.6  10.0-20.0  


 


Serum Glucose 220 H   mg/dL


 


Calcium Level 10.0  8.7-10.4  mg/dL








Time of 1ST Reevaluation:  14:39


Reevaluation 1ST:  Unchanged


Patient Education/Counseling:  Diagnosis, Treatment


Family Education/Counseling:  No Family Present





Departure 1


Departure


Time of Disposition:  17:15 (Patient with a acute urinary retention with 

secondary to a clogged Flores catheter.  Catheter was placed in the ER with good 

output.  Patient is now asymptomatic we will discharge patient home with a leg 

bag.)


Impression:  


   Primary Impression:  


   Complication of Flores catheter


   Qualified Codes:  T83.9XXA - Unspecified complication of genitourinary 

   prosthetic device, implant and graft, initial encounter


Disposition:  01 HOME / SELF CARE / HOMELESS


Condition:  Stable





Additional Instructions:  


Your catheter was replaced today.


It is important to follow up with the regular doctor.


Discharged With:  Self





Critical Care Note


Critical Care Time?:  No





Stability


Stability form required:  No





Heart Score


Heart Score:  








Heart Score Response (Comments) Value


 


History N/A 0


 


EKG N/A 0


 


Age N/A 0


 


Risk Factors N/A 0


 


Troponin N/A 0


 


Total  0














I personally scribed for MAYTE QUACH MD ([email or web address]) on 12/1/24 at 14:38.  

Electronically submitted by Dari Freeman ([email or web address]).


I personally scribed for MAYTE QUACH MD ([email or web address]) on 12/1/24 at 15:34.  

Electronically submitted by Dari Freeman ([email or web address]).





MAYTE QUACH MD                Dec 1, 2024 14:38

## 2024-12-01 NOTE — ED.PDOC
General


HPI Comments


THIS IS A 74-YEAR-OLD FEMALE PRESENTS TO THE ED PATIENT HISTORY WITH INDWELLING 

CATHETER COMPLAINING OF BURNING WITH URINATION AND BLOOD IN CATHETER BAG X1 DAY.

 HE NOTES LAST TIME CATHETER WAS CHANGED WAS 1 WEEK AGO.  DENIES ANY KNOWN 

TRAUMA.  STATES 8/10 BURNING PAIN DURING URINATION ALSO NOTES SUPRAPUBIC PAIN.  

DENIES FEVERS, CHILLS, NAUSEA, ABDOMINAL PAIN, FLANK PAIN, VOMITING OR WEAKNESS.


Chief Complaint:  Urinary


Time Seen by MD:  04:27


Primary Care Provider:  None


Reviewed notes:  Nurses Notes, Medications, Allergies


Allergies:  


Coded Allergies:  


     NO KNOWN ALLERGIES (Unverified , 2/1/21)


Home Meds


Active Scripts


Ciprofloxacin Hcl (Cipro) 500 Mg Tab, 1 TAB PO BID for 10 Days, #20 TAB


   Prov:JAQUELINE WERNER         12/1/24


Ciprofloxacin Hcl (Cipro) 500 Mg Tab, 1 TAB PO BID, #20 TAB


   Prov:ELMER ROBERTSON MD         9/22/24


Tamsulosin Hcl (Tamsulosin Hcl) 0.4 Mg Cap, 1 CAP PO DAILY for 30 Days, #30 CAP 

5 Refills


   Prov:RAMILA CASTANEDA MD         1/21/23


Ciprofloxacin Hcl (Cipro) 500 Mg Tab, 1 TAB PO BID for 10 Days, #20 TAB


   Prov:RAMILA CASTANEDA MD         1/21/23


Tamsulosin Hcl (Flomax) 0.4 Mg Cap, 1 CAP PO DAILY, #30 CAP


   Prov:KITTY FRANCO MD         12/10/18


Metformin Hydrochloride (Metformin Hcl) 500 Mg Tab, 1 TAB PO BID, #60 TAB


   Prov:KITTY FRANCO MD         12/10/18


Metronidazole (Flagyl) 500 Mg Tab, 500 MG PO Q8HR, #21 TAB


   Prov:KITTY FRANCO MD         12/10/18


Levofloxacin (Levaquin) 500 Mg Tab, 500 MG PO DAILY, #7 TAB


   Prov:KITTY FRANCO MD         12/10/18


Information Source:  Patient


Mode of Arrival:  Ambulatory





Past Medical History


PAST MEDICAL HISTORY:  DM


Surgical History:  Appendectomy





Family History


Family History:  Reviewed,noncontributory to illness, Unknown





Social History


Smoker:  Non-Smoker


Alcohol:  Denies ETOH Use


Drugs:  Denies Drug Use


Lives In:  Home





Constitutional:  denies: chills, diaphoresis, fatigue, fever, malaise, sweats, 

weakness, others


EENTM:  denies: blurred vision, double vision, ear bleeding, ear discharge, ear 

drainage, ear pain, ear ringing, eye pain, eye redness, hearing loss, mouth 

pain, mouth swelling, nasal discharge, nose bleeding, nose congestion, nose 

pain, photophobia, tearing, throat pain, throat swelling, voice changes, others


Respiratory:  denies: cough, hemoptysis, orthopnea, SOB at rest, shortness of 

breath, SOB with excertion, stridor, wheezing, others


Cardiovascular:  denies: chest pain, dizzy spells, diaphoresis, Dyspnea on 

exertion, edema, irregular heart beat, left arm pain, lightheadedness, 

palpitations, PND, syncope, others


Gastrointestinal:  denies: abdomen distended, abdominal pain, blood streaked 

bowels, constipated, diarrhea, dysphagia, difficulty swallowing, hematemesis, 

melena, nausea, poor appetite, poor fluid intake, rectal bleeding, rectal pain, 

vomiting, others


Genitourinary:  reports: burning, hematuria; denies: dysuria, flank pain, 

frequency, incontinence, penile discharge, penile sore, pain, testicle pain, 

testicle swelling, urgency, others


Neurological:  denies: dizziness, fainting, headache, left sided numbness, left 

sided weakness, numbness, paresthesia, pre-existing deficit, right sided 

numbness, right sided weakness, seizure, speech problems, tingling, tremors, 

weakness, others


Musculoskeletal:  denies: back pain, gout, joint pain, joint swelling, muscle 

pain, muscle stiffness, neck pain, others


Integumetry:  denies: bruises, change in color, change in hair/nails, dryness, 

laceration, lesions, lumps, rash, wounds, others


Allergic/Immunocompromised:  denies: Difficulty Healing, Frequent Infections, 

Hives, Itching, others


Hematologic/Lymphatic:  denies: anemia, blood clots, easy bleeding, easy 

bruising, swollen glands, others


Endocrine:  denies: excessive hunger, excessive sweating, excessive thirst, 

excessive urination, flushing, intolerance to cold, intolerance to heat, 

unexplained weight gain, unexplained weight loss, others


Psychiatric:  denies: anxiety, bipolar disorder, depression, hopeless, panic 

disorder, schizophrenia, sleepless, suicidal, others





Physical Exam


General Appearance:  No Apparent Distress, Normal


HEENT:  Pharynx Normal


Neck:  Full Range of Motion, Non-Tender


Respiratory:  Lungs Clear, No Respiratory Distress, Normal Breath Sounds


Cardiovascular:  No Murmur, Normal Peripheral Pulses, Regular Rate/Rhythm


Breast Exam:  Deferred


Gastrointestinal:  No Organomegaly, Non Tender, No Pulsatile Mass, Normal Bowel 

Sounds, Soft, Suprapubic (MODERATE TENDERNESS PALPATED.  NEGATIVE CVA 

TENDERNESS.)


Genitalia:  Other (NOTED RED TINGED URINE IN AHMADI CATHETER COLLECTION BAG.), 

Deferred


Pelvic:  Deferred


Rectal:  Deferred


Extremities:  No calf tenderness, Normal capillary refill, Normal inspection, 

Normal range of motion, Non-tender, No pedal edema


Musculoskeletal :  


   Apperance:  Normal


Neurologic:  Alert, CNs II-XII nml as Tested, No Motor Deficits, Normal Affect, 

Normal Mood, No Sensory Deficits


Cerebellar Function:  Normal


Reflexes:  Normal


Skin:  Dry, Normal Color, Warm


Lymphatic:  No Adenopathy





Was a procedure done?


Was a procedure done?:  No





Differential Diagnosis


Kidney stone (Female):  N/A


Penile/Scrotal:  UTI, Urolithiasis, Urinary Retention





X-Ray, Labs, Meds, VS





                                   Vital Signs








  Date Time  Temp Pulse Resp B/P (MAP) Pulse Ox O2 Delivery O2 Flow Rate FiO2


 


12/1/24 04:58  111 16  95 Room Air  


 


12/1/24 04:27 98.7 111 16 165/87 (113) 95   








                                       Lab








Test


 12/1/24


04:45 Range/Units


 


 


Urine Color Light-red  Yellow  


 


Urine Clarity Ex.turbid  Clear  


 


Urine pH 6.0  5.0-9.0  


 


Urine Specific Gravity 1.014  1.001-1.035  


 


Urine Protein 2+ H Negative  


 


Urine Ketones Negative  Negative  


 


Urine Blood 3+ H Negative  /uL


 


Urine Nitrite Negative  Negative  


 


Urine Bilirubin Negative  Negative  


 


Urine Urobilinogen Normal  Negative  mg/dL


 


Urine Leukocyte Esterase 3+  Negative  /uL


 


Urine RBC 2200  0 - 3  /hpf


 


Urine WBC 1433  0 - 3  /hpf


 


Urine Squamous Epithelial


Cells None seen 


 <5  /hpf





 


Urine Bacteria None seen  None Seen  /hpf


 


Urine Mucus Few  None Seen  


 


Urine Glucose Normal  Normal  mg/dL








                               Current Medications








 Medications


  (Trade)  Dose


 Ordered  Sig/Daisy


 Route  Start Time


 Stop Time Status Last Admin


 


 Ceftriaxone Sodium


  (Rocephin)  1,000 mg  ONCE  ONCE


 IM  12/1/24 04:45


 12/1/24 04:46 DC 12/1/24 05:05





 


 Acetaminophen/


 Hydrocodone Bitart


  (Norco 5/325MG


 Tab)  1 tab  ONCE  ONCE


 PO  12/1/24 05:15


 12/1/24 05:16 DC 12/1/24 05:04











X-Ray, Labs, Meds, VS Comment


ROCEPHIN 1 G IM GIVEN.  HYDROCODONE 5 MG P.O. GIVEN FOR THE PAIN.  LIKELY UTI 

POSSIBLY ACUTE PROSTATITIS WILL TREAT WITH CIPRO TWICE DAILY X10 DAYS.  ADVISED 

TO FOLLOW UP WITH HIS UROLOGIST AND PCP.  ADVISED TO REST, INCREASE P.O. FLUIDS 

WITH ELECTROLYTES, OVER-THE-COUNTER TYLENOL AS NEEDED FOR PAIN PER LABELED 

DOSING INSTRUCTIONS.  ER RETURN PRECAUTIONS GIVEN PATIENT INDICATED 

UNDERSTANDING AGREES WITH DISCHARGE PLAN OF CARE.


Time of 1ST Reevaluation:  05:39


Reevaluation 1ST:  Improved


Patient Education/Counseling:  Diagnosis, Treatment, Prognosis, Need For Follow 

Up


Family Education/Counseling:  No Family Present





Departure 1


Departure


Time of Disposition:  05:39


Impression:  


   Primary Impression:  


   UTI (urinary tract infection)


   Qualified Codes:  N30.01 - Acute cystitis with hematuria


Disposition:  01 HOME / SELF CARE / HOMELESS


Condition:  Stable


e-Prescriptions


Ciprofloxacin Hcl (Cipro) 500 Mg Tab


1 TAB PO BID for 10 Days, #20 TAB


   Prov: JAQUELINE WERNER         12/1/24


Discharged With:  Self





Critical Care Note


Critical Care Time?:  No





Stability


Stability form required:  JAQUELINE Kerr                 Dec 1, 2024 04:53

## 2025-01-03 ENCOUNTER — HOSPITAL ENCOUNTER (EMERGENCY)
Dept: HOSPITAL 15 - ER | Age: 75
Discharge: HOME | End: 2025-01-03
Payer: MEDICARE

## 2025-01-03 VITALS
RESPIRATION RATE: 18 BRPM | SYSTOLIC BLOOD PRESSURE: 141 MMHG | DIASTOLIC BLOOD PRESSURE: 91 MMHG | HEART RATE: 81 BPM | TEMPERATURE: 98.8 F | OXYGEN SATURATION: 96 %

## 2025-01-03 VITALS — HEIGHT: 68 IN | BODY MASS INDEX: 30.07 KG/M2 | WEIGHT: 198.42 LBS

## 2025-01-03 DIAGNOSIS — Z79.84: ICD-10-CM

## 2025-01-03 DIAGNOSIS — Z88.6: ICD-10-CM

## 2025-01-03 DIAGNOSIS — E11.9: ICD-10-CM

## 2025-01-03 DIAGNOSIS — Z90.49: ICD-10-CM

## 2025-01-03 DIAGNOSIS — Z79.899: ICD-10-CM

## 2025-01-03 DIAGNOSIS — Z46.6: Primary | ICD-10-CM

## 2025-01-03 DIAGNOSIS — Z87.440: ICD-10-CM

## 2025-01-03 PROCEDURE — 51702 INSERT TEMP BLADDER CATH: CPT

## 2025-01-03 NOTE — ED.PDOC
General


HPI Comments


A 74 YEAR OLD MALE PRESENTS TO THE ED WITH CHIEF COMPLAINT OF AHMADI CATHETER 

REMOVAL. PATIENT REPORTS THAT HE HAS HAD HIS AHMADI CATHETER IN PLACE FOR ABOUT A

MONTH AND NEEDS A REPLACEMENT. PATIENT RELAYS THAT HE IS CURRENTLY ON MACROBID 

FOR A PREVIOUS URINE INFECTION. PATIENT DENIES ANY DYSURIA, HEMATURIA, ABDOMINAL

PAIN, FEVER, OR CHILLS.


Chief Complaint:  Urinary


Time Seen by MD:  09:46


Primary Care Provider:  None


Reviewed notes:  Nurses Notes, Medications, Allergies


Allergies:  


Coded Allergies:  


     Aspirin (Verified  Allergy, Severe, 12/5/24)


Home Meds


Active Scripts


Sulfamethoxazole W/Trimethopri (Bactrim Ds Tablet) 1 Tab Tb, 1 TAB PO BID, #20 

TAB


   Prov:SHYELA JEROME         1/3/25


Ciprofloxacin Hcl (Cipro) 500 Mg Tab, 1 TAB PO BID, #20 TAB


   Prov:ELMER ROBERTSON MD         9/22/24


Tamsulosin Hcl (Tamsulosin Hcl) 0.4 Mg Cap, 1 CAP PO DAILY for 30 Days, #30 CAP 

5 Refills


   Prov:RAMILA CASTANEDA MD         1/21/23


Ciprofloxacin Hcl (Cipro) 500 Mg Tab, 1 TAB PO BID for 10 Days, #20 TAB


   Prov:RAMILA CASTANEDA MD         1/21/23


Tamsulosin Hcl (Flomax) 0.4 Mg Cap, 1 CAP PO DAILY, #30 CAP


   Prov:KITTY FRANCO MD         12/10/18


Metformin Hydrochloride (Metformin Hcl) 500 Mg Tab, 1 TAB PO BID, #60 TAB


   Prov:KITTY FRANCO MD         12/10/18


Metronidazole (Flagyl) 500 Mg Tab, 500 MG PO Q8HR, #21 TAB


   Prov:KITTY FRANCO MD         12/10/18


Levofloxacin (Levaquin) 500 Mg Tab, 500 MG PO DAILY, #7 TAB


   Prov:KITTY FRANCO MD         12/10/18


Information Source:  Patient


Mode of Arrival:  Ambulatory


Severity:  Moderate


Inability to void:  None


Timing:  Months


Prehospital treatment:  None


Symptoms:  None


History of:  Chronic indwelling ahmadi


Location:  None


Penile discharge:  None


Modifying factors:  None


associated signs and symptoms:  None





Past Medical History


PAST MEDICAL HISTORY:  DM


Past Medical History (Other):  


BPH


Surgical History:  Appendectomy





Family History


Family History:  Reviewed,noncontributory to illness, Unknown





Social History


Smoker:  Non-Smoker


Alcohol:  Denies ETOH Use


Drugs:  Denies Drug Use


Lives In:  Home





Constitutional:  denies: chills, diaphoresis, fatigue, fever, malaise, sweats, 

weakness, others


EENTM:  denies: blurred vision, double vision, ear bleeding, ear discharge, ear 

drainage, ear pain, ear ringing, eye pain, eye redness, hearing loss, mouth 

pain, mouth swelling, nasal discharge, nose bleeding, nose congestion, nose 

pain, photophobia, tearing, throat pain, throat swelling, voice changes, others


Respiratory:  denies: cough, hemoptysis, orthopnea, SOB at rest, shortness of 

breath, SOB with excertion, stridor, wheezing, others


Cardiovascular:  denies: chest pain, dizzy spells, diaphoresis, Dyspnea on 

exertion, edema, irregular heart beat, left arm pain, lightheadedness, 

palpitations, PND, syncope, others


Gastrointestinal:  denies: abdomen distended, abdominal pain, blood streaked 

bowels, constipated, diarrhea, dysphagia, difficulty swallowing, hematemesis, 

melena, nausea, poor appetite, poor fluid intake, rectal bleeding, rectal pain, 

vomiting, others


Genitourinary:  denies: burning, dysuria, flank pain, frequency, hematuria, 

incontinence, penile discharge, penile sore, pain, testicle pain, testicle 

swelling, urgency, others


Neurological:  denies: dizziness, fainting, headache, left sided numbness, left 

sided weakness, numbness, paresthesia, pre-existing deficit, right sided 

numbness, right sided weakness, seizure, speech problems, tingling, tremors, 

weakness, others


Musculoskeletal:  denies: back pain, gout, joint pain, joint swelling, muscle 

pain, muscle stiffness, neck pain, others


Integumetry:  denies: bruises, change in color, change in hair/nails, dryness, 

laceration, lesions, lumps, rash, wounds, others


Allergic/Immunocompromised:  denies: Difficulty Healing, Frequent Infections, 

Hives, Itching, others


Hematologic/Lymphatic:  denies: anemia, blood clots, easy bleeding, easy 

bruising, swollen glands, others


Endocrine:  denies: excessive hunger, excessive sweating, excessive thirst, 

excessive urination, flushing, intolerance to cold, intolerance to heat, 

unexplained weight gain, unexplained weight loss, others


Psychiatric:  denies: anxiety, bipolar disorder, depression, hopeless, panic 

disorder, schizophrenia, sleepless, suicidal, others


All Other Systems:  Reviewed and Negative





Physical Exam


General Appearance:  No Apparent Distress, Normal


HEENT:  Normal ENT Inspection, PERRL/EOMI


Neck:  Full Range of Motion, Non-Tender, Normal, Normal Inspection


Respiratory:  Chest Non-Tender, Lungs Clear, No Accessory Muscle Use, No 

Respiratory Distress, Normal Breath Sounds


Cardiovascular:  No Edema, No JVD, No Murmur, No Gallop, Normal Peripheral 

Pulses, Regular Rate/Rhythm


Breast Exam:  Deferred


Gastrointestinal:  No Organomegaly, Non Tender, No Pulsatile Mass, Normal Bowel 

Sounds, Soft


Genitalia:  Other (AHMADI CATHETER IN PLACE)


Pelvic:  Deferred


Rectal:  Deferred


Extremities:  No calf tenderness, Normal capillary refill, Normal inspection, 

Normal range of motion, Non-tender, No pedal edema


Musculoskeletal :  


   Apperance:  Normal


Neurologic:  Alert, CNs II-XII nml as Tested, No Motor Deficits, Normal Affect, 

Normal Mood, No Sensory Deficits


Cerebellar Function:  Normal


Reflexes:  Normal


Skin:  Dry, Normal Color, Warm


Peripheral Pulses:  2+ carotid (R), 2+ carotid (L)


Lymphatic:  No Adenopathy





Was a procedure done?


Was a procedure done?:  No





Differential Diagnosis


Kidney stone (Female):  N/A


Kidney stone (Male):  N/A


Penile/Scrotal:  N/A


Urinary Problem (Male):  UTI, Other (CATHETER REPLACEMENT)





X-Ray, Labs, Meds, VS





                                   Vital Signs








  Date Time  Temp Pulse Resp B/P (MAP) Pulse Ox O2 Delivery O2 Flow Rate FiO2


 


1/3/25 09:02 98.8 81 18 141/91 (108) 96   








X-Ray, Labs, Meds, VS Comment


- I reviewed the following notes from patient's past medical encounters: 12/1/24

FOR CATHETER COMPLICATION





- The following tests were ordered, and results were reviewed by me: SOCORRO





- Additional information was gathered from interviewing the following 

independent Historian: SOCORRO





- I reviewed and agreed with the following test results read by other provider: 

SOCORRO





- I discussed treatments and results with medical personnel.





TREATMENT: AHMADI CATHETER REPLACED


Time of 1ST Reevaluation:  10:30


Reevaluation 1ST:  Improved


Patient Education/Counseling:  Diagnosis, Treatment


Family Education/Counseling:  Diagnosis, Treatment, No Family Present


Medical Screening:  No EMC Exist At This Time





Departure 1


Departure


Time of Disposition:  10:30


Impression:  


   Primary Impression:  


   Urinary catheter (Ahmadi) change required


Disposition:  01 HOME / SELF CARE / HOMELESS


Condition:  Stable





Additional Instructions:  


FOLLOW UP WITH PCP WITHIN 2-3 DAYS.


e-Prescriptions


Sulfamethoxazole W/Trimethopri (Bactrim Ds Tablet) 1 Tab Tb


1 TAB PO BID, #20 TAB


   Prov: SHEYLA JEROME         1/3/25


Discharged With:  Self





Critical Care Note


Critical Care Time?:  No





Stability


Stability form required:  No





Heart Score


Heart Score:  








Heart Score Response (Comments) Value


 


History N/A 0


 


EKG N/A 0


 


Age N/A 0


 


Risk Factors N/A 0


 


Troponin N/A 0


 


Total  0














I personally scribed for SHEYLA JEROME ([email or web address]) on 1/3/25 at 09:51.  

Electronically submitted by Lalo Lozano ([email or web address]).


I personally scribed for SHEYLA JEROME ([email or web address]) on 1/3/25 at 10:01.  

Electronically submitted by Lalo Lozano ([email or web address]).





SHEYLA JEROME                  Barak 3, 2025 09:51